# Patient Record
Sex: FEMALE | Race: WHITE
[De-identification: names, ages, dates, MRNs, and addresses within clinical notes are randomized per-mention and may not be internally consistent; named-entity substitution may affect disease eponyms.]

---

## 2019-02-21 ENCOUNTER — HOSPITAL ENCOUNTER (OUTPATIENT)
Dept: HOSPITAL 89 - RAD | Age: 29
End: 2019-02-21
Attending: OBSTETRICS & GYNECOLOGY
Payer: COMMERCIAL

## 2019-02-21 VITALS — BODY MASS INDEX: 28.79 KG/M2

## 2019-02-21 DIAGNOSIS — N64.4: Primary | ICD-10-CM

## 2019-02-25 NOTE — RADIOLOGY IMAGING REPORT
FACILITY: [facility] 

 

PATIENT NAME: REBEKAH WILDER

: [id number]

MR: [id number]

V: [id number]

EXAM DATE: 

ORDERING PHYSICIAN: SALOME MEMBRENO

TECHNOLOGIST: Radha Mahmood RT(R)(CT)

 

PROCEDURE:US RIGHT BREAST COMPLETE

 

COMPARISON:None.

 

INDICATIONS:Right breast pain

 

FINDINGS:  

The entire Right breast was imaged sonographically revealing no 

sonographic abnormality cystic or solid. No abnormal fluid collections 

were identified. No demonstration of dilated ducts.

 

DIAGNOSTIC CATEGORY 1--NEGATIVE.  

 

RECOMMENDATIONS:

CLINICAL EVALUATION.   

 

 

IMPRESSION:

BIRADS 1: Negative. 

No sonographic abnormality of the Right breast is seen. Clinical 

follow-up recommended for patient's Right breast pain and palpable 

findings in the 9 o'clock position of the Right breast.

 

 

 

 

Dictated by:  Marine Noriega M.D. on 2019 at 17:17   

Transcribed by: FIX on 2019 at 10:10    

Approved by:  Marine Noriega M.D. on 2019 at 18:03   

[facility]

## 2019-08-05 ENCOUNTER — HOSPITAL ENCOUNTER (OUTPATIENT)
Dept: HOSPITAL 89 - LAB | Age: 29
End: 2019-08-05
Attending: OBSTETRICS & GYNECOLOGY
Payer: COMMERCIAL

## 2019-08-05 VITALS — BODY MASS INDEX: 28.79 KG/M2

## 2019-08-05 DIAGNOSIS — Z34.01: Primary | ICD-10-CM

## 2019-08-05 LAB — PLATELET COUNT, AUTOMATED: 256 K/UL (ref 150–450)

## 2019-08-05 PROCEDURE — 86762 RUBELLA ANTIBODY: CPT

## 2019-08-05 PROCEDURE — 86850 RBC ANTIBODY SCREEN: CPT

## 2019-08-05 PROCEDURE — 87088 URINE BACTERIA CULTURE: CPT

## 2019-08-05 PROCEDURE — 86900 BLOOD TYPING SEROLOGIC ABO: CPT

## 2019-08-05 PROCEDURE — 85025 COMPLETE CBC W/AUTO DIFF WBC: CPT

## 2019-08-05 PROCEDURE — 81001 URINALYSIS AUTO W/SCOPE: CPT

## 2019-08-05 PROCEDURE — 36415 COLL VENOUS BLD VENIPUNCTURE: CPT

## 2019-08-05 PROCEDURE — 86703 HIV-1/HIV-2 1 RESULT ANTBDY: CPT

## 2019-08-05 PROCEDURE — 87340 HEPATITIS B SURFACE AG IA: CPT

## 2019-08-05 PROCEDURE — 86592 SYPHILIS TEST NON-TREP QUAL: CPT

## 2019-08-05 PROCEDURE — 86901 BLOOD TYPING SEROLOGIC RH(D): CPT
